# Patient Record
Sex: MALE | Race: WHITE
[De-identification: names, ages, dates, MRNs, and addresses within clinical notes are randomized per-mention and may not be internally consistent; named-entity substitution may affect disease eponyms.]

---

## 2019-09-17 ENCOUNTER — HOSPITAL ENCOUNTER (INPATIENT)
Dept: HOSPITAL 35 - ER | Age: 74
LOS: 2 days | Discharge: HOME | DRG: 262 | End: 2019-09-19
Attending: HOSPITALIST | Admitting: HOSPITALIST
Payer: COMMERCIAL

## 2019-09-17 VITALS — DIASTOLIC BLOOD PRESSURE: 59 MMHG | SYSTOLIC BLOOD PRESSURE: 106 MMHG

## 2019-09-17 VITALS — DIASTOLIC BLOOD PRESSURE: 61 MMHG | SYSTOLIC BLOOD PRESSURE: 105 MMHG

## 2019-09-17 VITALS — SYSTOLIC BLOOD PRESSURE: 110 MMHG | DIASTOLIC BLOOD PRESSURE: 72 MMHG

## 2019-09-17 VITALS — HEIGHT: 67.99 IN | BODY MASS INDEX: 28.34 KG/M2 | WEIGHT: 187 LBS

## 2019-09-17 VITALS — SYSTOLIC BLOOD PRESSURE: 133 MMHG | DIASTOLIC BLOOD PRESSURE: 70 MMHG

## 2019-09-17 DIAGNOSIS — Z82.49: ICD-10-CM

## 2019-09-17 DIAGNOSIS — F32.9: ICD-10-CM

## 2019-09-17 DIAGNOSIS — E83.42: ICD-10-CM

## 2019-09-17 DIAGNOSIS — Z90.49: ICD-10-CM

## 2019-09-17 DIAGNOSIS — I95.9: Primary | ICD-10-CM

## 2019-09-17 DIAGNOSIS — Z85.46: ICD-10-CM

## 2019-09-17 DIAGNOSIS — F17.220: ICD-10-CM

## 2019-09-17 DIAGNOSIS — E87.6: ICD-10-CM

## 2019-09-17 LAB
ALBUMIN SERPL-MCNC: 3 G/DL (ref 3.4–5)
ALBUMIN SERPL-MCNC: 3 G/DL (ref 3.4–5)
ALT SERPL-CCNC: 19 U/L (ref 30–65)
ANION GAP SERPL CALC-SCNC: 11 MMOL/L (ref 7–16)
ANISOCYTOSIS BLD QL SMEAR: (no result)
AST SERPL-CCNC: 32 U/L (ref 15–37)
BASOPHILS NFR BLD AUTO: 0 % (ref 0–2)
BILIRUB SERPL-MCNC: 0.4 MG/DL
BILIRUB UR-MCNC: NEGATIVE MG/DL
BUN SERPL-MCNC: 13 MG/DL (ref 7–18)
CALCIUM SERPL-MCNC: 8.9 MG/DL (ref 8.5–10.1)
CHLORIDE SERPL-SCNC: 103 MMOL/L (ref 98–107)
CO2 SERPL-SCNC: 26 MMOL/L (ref 21–32)
COLOR UR: YELLOW
CREAT SERPL-MCNC: 0.9 MG/DL (ref 0.7–1.3)
EOSINOPHIL NFR BLD: 2 % (ref 0–3)
ERYTHROCYTE [DISTWIDTH] IN BLOOD BY AUTOMATED COUNT: 15 % (ref 10.5–14.5)
GLUCOSE SERPL-MCNC: 110 MG/DL (ref 74–106)
GRANULOCYTES NFR BLD MANUAL: 68 % (ref 36–66)
HCT VFR BLD CALC: 42.7 % (ref 42–52)
HGB BLD-MCNC: 14.2 GM/DL (ref 14–18)
KETONES UR STRIP-MCNC: NEGATIVE MG/DL
LYMPHOCYTES NFR BLD AUTO: 22 % (ref 24–44)
MCH RBC QN AUTO: 32.8 PG (ref 26–34)
MCHC RBC AUTO-ENTMCNC: 33.2 G/DL (ref 28–37)
MCV RBC: 98.9 FL (ref 80–100)
MONOCYTES NFR BLD: 8 % (ref 1–8)
NEUTROPHILS # BLD: 2 THOU/UL (ref 1.4–8.2)
NEUTS BAND NFR BLD: 0 % (ref 0–8)
PLATELET # BLD: 208 THOU/UL (ref 150–400)
POTASSIUM SERPL-SCNC: 3.4 MMOL/L (ref 3.5–5.1)
PROT SERPL-MCNC: 6 G/DL (ref 6.4–8.2)
PROT SERPL-MCNC: 6.3 G/DL (ref 6.4–8.2)
RBC # BLD AUTO: 4.32 MIL/UL (ref 4.5–6)
RBC # UR STRIP: NEGATIVE /UL
SODIUM SERPL-SCNC: 140 MMOL/L (ref 136–145)
SP GR UR STRIP: 1.01 (ref 1–1.03)
TROPONIN I SERPL-MCNC: <0.06 NG/ML (ref ?–0.06)
TSH SERPL-ACNC: 1.15 UIU/ML (ref 0.36–3.74)
URINE CLARITY: CLEAR
URINE GLUCOSE-RANDOM*: NEGATIVE
URINE LEUKOCYTES-REFLEX: NEGATIVE
URINE NITRITE-REFLEX: NEGATIVE
URINE PROTEIN (DIPSTICK): NEGATIVE
UROBILINOGEN UR STRIP-ACNC: 0.2 E.U./DL (ref 0.2–1)
VIT B12 SERPL-MCNC: 1689 PG/ML (ref 193–986)
WBC # BLD AUTO: 2.9 THOU/UL (ref 4–11)

## 2019-09-17 PROCEDURE — 10100: CPT

## 2019-09-17 NOTE — NUR
ASSUMED CARE OF PATIENT AROUND 1600 WHEN PATIENT WAS ADMITTED TO FLOOR FROM
EMERGENCY DEPARTMENT. PATIENT DID NOT COMPLAIN OF ANY PAIN, WEAKNESS, OR
DIZZINESS ON ADMISSION. AWAITING PATIENT MEDICAL RECORDS FROM [facility].

## 2019-09-18 VITALS — SYSTOLIC BLOOD PRESSURE: 124 MMHG | DIASTOLIC BLOOD PRESSURE: 85 MMHG

## 2019-09-18 VITALS — DIASTOLIC BLOOD PRESSURE: 84 MMHG | SYSTOLIC BLOOD PRESSURE: 135 MMHG

## 2019-09-18 VITALS — DIASTOLIC BLOOD PRESSURE: 72 MMHG | SYSTOLIC BLOOD PRESSURE: 111 MMHG

## 2019-09-18 VITALS — SYSTOLIC BLOOD PRESSURE: 115 MMHG | DIASTOLIC BLOOD PRESSURE: 62 MMHG

## 2019-09-18 VITALS — SYSTOLIC BLOOD PRESSURE: 127 MMHG | DIASTOLIC BLOOD PRESSURE: 78 MMHG

## 2019-09-18 LAB
ANION GAP SERPL CALC-SCNC: 8 MMOL/L (ref 7–16)
BUN SERPL-MCNC: 15 MG/DL (ref 7–18)
CALCIUM SERPL-MCNC: 7.8 MG/DL (ref 8.5–10.1)
CHLORIDE SERPL-SCNC: 106 MMOL/L (ref 98–107)
CO2 SERPL-SCNC: 27 MMOL/L (ref 21–32)
CREAT SERPL-MCNC: 0.8 MG/DL (ref 0.7–1.3)
ERYTHROCYTE [DISTWIDTH] IN BLOOD BY AUTOMATED COUNT: 14.6 % (ref 10.5–14.5)
GLUCOSE SERPL-MCNC: 143 MG/DL (ref 74–106)
HCT VFR BLD CALC: 37.5 % (ref 42–52)
HGB BLD-MCNC: 12.3 GM/DL (ref 14–18)
MAGNESIUM SERPL-MCNC: 1.5 MG/DL (ref 1.8–2.4)
MCH RBC QN AUTO: 32.8 PG (ref 26–34)
MCHC RBC AUTO-ENTMCNC: 32.9 G/DL (ref 28–37)
MCV RBC: 99.8 FL (ref 80–100)
PLATELET # BLD: 151 THOU/UL (ref 150–400)
POTASSIUM SERPL-SCNC: 3.2 MMOL/L (ref 3.5–5.1)
RBC # BLD AUTO: 3.75 MIL/UL (ref 4.5–6)
SODIUM SERPL-SCNC: 141 MMOL/L (ref 136–145)
WBC # BLD AUTO: 2.7 THOU/UL (ref 4–11)

## 2019-09-18 NOTE — NUR
INITIAL ASSESSMENT:
SW reviewed chart and spoke with nursing and attending physician. Pt was
admitted from home due to hypotension/dizziness/near syncope. Pt currently off
the unit. Cardiology and Neurology consulted. Per chart, pt is
alert/orientated x 4. Pt lives at home with his wife. Prior to admission, pt
was independent with ADLs. Physical therapy evaluated and discharged pt from
their services. Pt will discharge home when medically stable. LUZ ELENA is following
to assist as needed with discharge planning.

## 2019-09-18 NOTE — NUR
PATIENT INDEP WITH ALL SELF CARE, HE STATES HE DOES NOT FEEL HE NEEDS OT
SERVICES. WILL DISCHARGE FROM OT SERVICES.

## 2019-09-18 NOTE — NUR
PATIENT DOES NOT SEEM TO BE IN PAIN AT THIS TIME. HE HAS NOT REPORTED ANY
DIZZINESS THIS SHIFT. RESPIRATONS ARE NON LABORED. MOST LIKELY GO HOME
TOMORROW. HAD MRI TODAY. WILL CONT WITH PLAN OF CARE.

## 2019-09-18 NOTE — NUR
ASSUMED CARE AROUND 1900. AXOX4. DENIES DIZZINESS AT THIS TIME. NO S/S ACUTE
DISTRESS NOTED OR REPORTED AT THIS TIME. WILL CONT TO MONITOR FOR ANY CHANGES
IN CONDITION.

## 2019-09-19 VITALS — SYSTOLIC BLOOD PRESSURE: 133 MMHG | DIASTOLIC BLOOD PRESSURE: 79 MMHG

## 2019-09-19 VITALS — DIASTOLIC BLOOD PRESSURE: 89 MMHG | SYSTOLIC BLOOD PRESSURE: 134 MMHG

## 2019-09-19 VITALS — SYSTOLIC BLOOD PRESSURE: 134 MMHG | DIASTOLIC BLOOD PRESSURE: 89 MMHG

## 2019-09-19 VITALS — SYSTOLIC BLOOD PRESSURE: 125 MMHG | DIASTOLIC BLOOD PRESSURE: 79 MMHG

## 2019-09-19 LAB
MAGNESIUM SERPL-MCNC: 1.7 MG/DL (ref 1.8–2.4)
POTASSIUM SERPL-SCNC: 3.7 MMOL/L (ref 3.5–5.1)

## 2019-09-19 PROCEDURE — 0JH632Z INSERTION OF MONITORING DEVICE INTO CHEST SUBCUTANEOUS TISSUE AND FASCIA, PERCUTANEOUS APPROACH: ICD-10-PCS | Performed by: INTERNAL MEDICINE

## 2019-09-19 NOTE — NUR
SW reviewed chart and spoke with attending physician. Pt to have loop monitor
placed today. Pt to be moved from room 447 to 363. LUZ ELENA is following to assist
as needed with discharge planning.

## 2019-09-19 NOTE — NUR
PATIENT HAD COME BACK FROM HIS LOOP RECORDER PLACEMENT AND WAS DISCHARGED.
ANOTHER RN HELPED WITH HIS PAPERWORK AND DISCHARGING HIM. POTASSIUM WAS
PROVIDED PRIOR TO HIM LEAVING AND HE WAS ABLE TO GO TO VEHICLE BY WHEELCHAIR.

## 2019-09-19 NOTE — NUR
Patient making progress towards out goals. BP stable, no syncopal episodes.
High fall precautions in place. Uses call light appropriately for needs.
Rhythm SA with BBB rate 50-70's. Denies pain. Kept NPO after MN for Loop
Recorder implantaion. Hibilens scrub to area.

## 2019-09-19 NOTE — NUR
PATIENT WENT TO PROCEEDURE FOR HIS LOOP RECORDER PLACEMENT. PRIOR TO LEAVING
THE NURSE NOTICED HIS IV APPEARED TO LEAK A LITTLE. NO CLOT NOTED, NO EDEMA.
NURSE REMOVED IV AND ATTEMPTED A NEW ONE WITHOUT SUCCESS. PATIENT EXPRESSED
THAT HE DID NOT WANT RN TO ATTEMPT AGAIN. NURSE TALKED TO HIM IN REGARDS TO
WHY IT IS IMPORTANT TO HAVE A NEW IV DURING A PROCEEDURE AND HE EXPRESSED HE
UNDERSTOOD THE POTENTIAL CONSEQUENCES AND DOES NOT WANT A NEW IV UNTIL HE
RETURNS. RN FOR THE PROCEEDURE WAS THERE AND EXPRESSED IT IS OK THAT IF THEY
NEED A NEW IV THEY WILL START ONE. PATIENT VERY PLEASANT GENTLEMAN AND
EXPRESSED HE DIDN'T WANT ANYONE TO WAIT ON HIM.

## 2019-09-19 NOTE — NUR
Patient signed consent for LOP recorder placement. Expressed undertsanding of
procedure and denies any questions at this time.